# Patient Record
Sex: FEMALE | Race: OTHER | HISPANIC OR LATINO | URBAN - METROPOLITAN AREA
[De-identification: names, ages, dates, MRNs, and addresses within clinical notes are randomized per-mention and may not be internally consistent; named-entity substitution may affect disease eponyms.]

---

## 2021-09-30 ENCOUNTER — EMERGENCY (EMERGENCY)
Facility: HOSPITAL | Age: 46
LOS: 0 days | Discharge: HOME | End: 2021-09-30
Attending: EMERGENCY MEDICINE
Payer: MEDICAID

## 2021-09-30 VITALS
HEART RATE: 68 BPM | TEMPERATURE: 99 F | RESPIRATION RATE: 19 BRPM | DIASTOLIC BLOOD PRESSURE: 88 MMHG | SYSTOLIC BLOOD PRESSURE: 132 MMHG | OXYGEN SATURATION: 99 % | WEIGHT: 147.93 LBS
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
## 2021-09-30 DIAGNOSIS — M54.5 LOW BACK PAIN: ICD-10-CM

## 2021-09-30 DIAGNOSIS — S39.012A STRAIN OF MUSCLE, FASCIA AND TENDON OF LOWER BACK, INITIAL ENCOUNTER: ICD-10-CM
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
## 2021-09-30 PROCEDURE — 99284 EMERGENCY DEPT VISIT MOD MDM: CPT

## 2021-09-30 RX ORDER — DIAZEPAM 5 MG
5 TABLET ORAL ONCE
Refills: 0 | Status: DISCONTINUED | OUTPATIENT
Start: 2021-09-30 | End: 2021-09-30

## 2021-09-30 RX ORDER — KETOROLAC TROMETHAMINE 30 MG/ML
30 SYRINGE (ML) INJECTION ONCE
Refills: 0 | Status: DISCONTINUED | OUTPATIENT
Start: 2021-09-30 | End: 2021-09-30

## 2021-09-30 RX ORDER — METHOCARBAMOL 500 MG/1
1 TABLET, FILM COATED ORAL
Qty: 21 | Refills: 0
Start: 2021-09-30 | End: 2021-10-06

## 2021-09-30 RX ORDER — IBUPROFEN 200 MG
1 TABLET ORAL
Qty: 42 | Refills: 0
Start: 2021-09-30 | End: 2021-10-13

## 2021-09-30 RX ADMIN — Medication 30 MILLIGRAM(S): at 17:03

## 2021-09-30 RX ADMIN — Medication 5 MILLIGRAM(S): at 17:03

## 2021-09-30 NOTE — ED PROVIDER NOTE - MUSCULOSKELETAL, MLM
Spine appears normal, range of motion is not limited, no midline tenderness.  (+) bilateral lower lumbar paraspinal tenderness with spasm R>L.  Straight leg raise (+) Right

## 2021-09-30 NOTE — ED PROVIDER NOTE - NSFOLLOWUPCLINICS_GEN_ALL_ED_FT
[facility] ([facility])  Rehabilitation  [facility] [address], [address] [place] [address]  Phone: [phone]  Fax:   Follow Up Time: 1-3 Days    [facility]  Medicine  [address], [place]   Phone: [phone]  Fax:   Follow Up Time: 1-3 Days

## 2021-09-30 NOTE — ED PROVIDER NOTE - NSFOLLOWUPINSTRUCTIONS_ED_ALL_ED_FT
Distensión lumbar    LO QUE NECESITA SABER:    ¿Qué es la distensión lumbar?La distensión lumbar es nataly lesión en los músculos o tendones de la parte inferior de la espalda. Los tendones son los tejidos meghann que conectan a los músculos con los huesos. La parte inferior de la espalda sostiene la mayor parte de man peso corporal y facilita man habilidad de moverse, torcerse y doblarse.    ¿Qué ocasiona la distensión lumbar?La distensión lumbar por lo general es provocada por actividades que aumentan la tensión en la parte inferior de la espalda ana maría el ejercicio o nataly lesión. Los siguientes podrían aumentar man riesgo para nataly distensión lumbar:   •Usted sufrió anteriormente nataly distensión lumbar.      •Usted levanta objetos pesados con man espalda en vez de usar rochelle piernas.      •Usted no hace calentamiento antes de hacer ejercicio.      •Usted pasa mucho tiempo parado o sentado.      •Usted tiene sobrepeso.      ¿Cuáles son los signos y síntomas de la distensión lumbar?  •Dolor en la parte inferior de la espalda o espasmos musculares      •Rigidez o movimiento limitado      •Dolor que se desplaza hacia los glúteos, allan o las piernas      •Dolor que empeora con la actividad      ¿Cómo se diagnostica la distensión lumbar?Podría realizarse nataly radiografía, nataly tomografía computarizada (TC) o nataly imagen por resonancia magnética (IRM) para revisar si hay daño en man columna, músculos o tendones. Es posible que le administren líquido de contraste para que la parte inferior de man espalda se david mejor en las imágenes. Dígale al médico si usted alguna vez ha tenido nataly reacción alérgica al líquido de contraste. No entre a la rodrick donde se realiza la resonancia magnética con algo de metal. El metal puede causar lesiones serias. Dígale al médico si usted tiene algo de metal dentro de man cuerpo o por encima.    ¿Cómo se trata la distensión lumbar?  •Acetaminofénalivia el dolor y baja la fiebre. Está disponible sin receta médica. Pregunte la cantidad y la frecuencia con que debe tomarlos. Siga las indicaciones. El acetaminofén puede causar daño en el hígado cuando no se slim de forma correcta.      •Los ABEL,ana maría el ibuprofeno, ayudan a disminuir la inflamación, el dolor y la fiebre. Beau medicamento está disponible con o sin nataly receta médica. Los ABEL pueden causar sangrado estomacal o problemas renales en ciertas personas. Si usted slim un medicamento anticoagulante, siempre pregúntele a man médico si los ABEL son seguros para usted. Siempre zach la etiqueta de beau medicamento y siga las instrucciones.      •Relajantes muscularesayudan a reducir dolor y espasmos musculares.      •Puede administrarsepodrían administrarse. Pregunte cómo elizabeth estos medicamentos de nataly forma delaney.      •La cirugíaPodría necesitarse nataly cirugíasi man distensión es severa.      ¿Cómo puedo controlar los síntomas?  •El descansosegún las indicaciones. Es probable que necesite descansar en cama moshe un tiempo después de lesionarse. No levante objetos pesados.      •Aplique hieloen la espalda de 15 a 20 minutos cada hora o ana maría se le indique. Use nataly compresa de hielo o ponga hielo triturado en nataly bolsa de plástico. Cúbrala con nataly toalla. El hielo ayuda a evitar daño al tejido y a disminuir la inflamación y el dolor.      •La aplicación de caloren la parte inferior de la espalda de 20 a 30 minutos cada 2 horas moshe los días que le indiquen. El calor ayuda a disminuir el dolor y los espasmos musculares.      •Comience lentamente a aumentar man nivel de actividadconforme disminuya el dolor o ana maría se lo indiquen.      ¿Cómo se puede evitar la distensión lumbar?  •Use movimientos corporales correctos.?Flexione la cadera y las rodillas cuando vaya a levantar un objeto. No doble la cintura. Utilice los músculos de las piernas mientras levanta man carga. No use man espalda. Mantenga el objeto cerca de man pecho mientras lo levanta. No se tuerza, ni levante cualquier cosa por encima de man cintura.      ?Cambie man posición frecuentemente cuando pase mucho tiempo de pie. Descanse un pie sobre nataly caja pequeña o un reposapiés e intercambie con el otro pie frecuentemente.      ?No permanezca sentado por lapsos de tiempo prolongados. Cuando sea necesario hacerlo, siéntese en nataly silla de respaldo recto con los pies apoyados en el suelo.      ?Nunca alcance, jale ni empuje mientras se encuentra sentando.      •Entre en calor antes de hacer ejercicio.Rosmery ejercicios que fortalezcan rochelle músculos de la espalda. Pregunte a man médico acerca del mejor plan de ejercicio para usted.      •Mantenga un peso saludable.Consulte con man médico cuánto debería pesar. Pida que le ayude a crear un plan para bajar de peso si usted tiene sobrepeso.      ¿Cuándo joe buscar atención inmediata?  •Usted oye o siente un estallido en la parte inferior de la espalda.      •Usted tiene mayor inflamación o dolor en la parte inferior de man espalda.      •Usted tiene dificultad para  las piernas.      •Rochelle piernas están entumecidas.      ¿Cuándo joe comunicarme con mi médico?  •Tiene fiebre.      •El dolor no desaparece, incluso después del tratamiento.      •Usted tiene preguntas o inquietudes acerca de man condición o cuidado.      ACUERDOS SOBRE MAN CUIDADO:    Usted tiene el derecho de ayudar a planear man cuidado. Aprenda todo lo que pueda sobre man condición y ana maría darle tratamiento. Discuta rochelle opciones de tratamiento con rochelle médicos para decidir el cuidado que usted desea recibir. Usted siempre tiene el derecho de rechazar el tratamiento.       © Copyright [employer] 2021           back to top                          © Copyright [employer] 2021

## 2021-09-30 NOTE — ED PROVIDER NOTE - OBJECTIVE STATEMENT
46 y.o. female without any PMH presented to the ER c/o low back pain since this morning.  Pain radiates down Right leg and is worse with movement and prolonged standing.  Denies fall, fever, chills, abdominal pain, flank pain, chest pain, extremity weakness/paresthesias, bladder/bowel incontinence, saddle numbness, dysuria, hematuria, ataxia. No other complaints.

## 2021-09-30 NOTE — ED PROVIDER NOTE - PATIENT PORTAL LINK FT
You can access the FollowMyHealth Patient Portal offered by [facility] by registering at the following website: [email or web address]. By joining [facility]’s FollowMyHealth portal, you will also be able to view your health information using other applications (apps) compatible with our system.

## 2021-09-30 NOTE — ED PROVIDER NOTE - ATTENDING CONTRIBUTION TO CARE
45 yo F presents to ED for back pain since last night. No trauma. Pain was mild last night but has worsened throughout the day. She has tried tylenol without relief. Pain is worse with movement. No numbness or tingling. No bladder or bowel incontinence or retention. No HA.     Const: Well nourished, well developed, appears stated age  Eyes: PERRL, no conjunctival injection  HENT:  Neck supple without meningismus   CV: RRR, Warm, well-perfused extremities  RESP: CTA B/L, no tachypnea   GI: soft, non-tender, non-distended  MSK: No gross deformities appreciated No c-spine, t-spine, l-spine tenderness or stepoffs. lumbar paraspinal tenderness   Skin: Warm, dry. No rashes  Neuro: Alert, CNs II-XII grossly intact. Sensation and motor function of extremities grossly intact.  Psych: Appropriate mood and affect.    will treat with pain medication and reassess

## 2021-09-30 NOTE — ED PROVIDER NOTE - CLINICAL SUMMARY MEDICAL DECISION MAKING FREE TEXT BOX
47 yo F presented to ED for back pain. Pt neurologically intact and all pain/tenderness is paraspinal. Pt improved with medication and has normal neuro exam. DC home with son